# Patient Record
Sex: MALE | Race: BLACK OR AFRICAN AMERICAN | NOT HISPANIC OR LATINO | Employment: UNEMPLOYED | URBAN - METROPOLITAN AREA
[De-identification: names, ages, dates, MRNs, and addresses within clinical notes are randomized per-mention and may not be internally consistent; named-entity substitution may affect disease eponyms.]

---

## 2017-07-03 ENCOUNTER — HOSPITAL ENCOUNTER (EMERGENCY)
Facility: HOSPITAL | Age: 37
Discharge: HOME | End: 2017-07-03
Attending: EMERGENCY MEDICINE
Payer: MEDICAID

## 2017-07-03 VITALS
WEIGHT: 170 LBS | HEIGHT: 74 IN | DIASTOLIC BLOOD PRESSURE: 90 MMHG | HEART RATE: 81 BPM | TEMPERATURE: 98 F | SYSTOLIC BLOOD PRESSURE: 138 MMHG | BODY MASS INDEX: 21.82 KG/M2 | RESPIRATION RATE: 16 BRPM | OXYGEN SATURATION: 98 %

## 2017-07-03 DIAGNOSIS — N48.1 BALANITIS: Primary | ICD-10-CM

## 2017-07-03 LAB
BILIRUB UR QL STRIP: NEGATIVE
CLARITY UR: CLEAR
COLOR UR: YELLOW
GLUCOSE UR QL STRIP: NEGATIVE
HGB UR QL STRIP: NEGATIVE
KETONES UR QL STRIP: NEGATIVE
LEUKOCYTE ESTERASE UR QL STRIP: NEGATIVE
NITRITE UR QL STRIP: NEGATIVE
PH UR STRIP: 7 [PH] (ref 5–8)
PROT UR QL STRIP: NEGATIVE
SP GR UR STRIP: 1.02 (ref 1–1.03)
URN SPEC COLLECT METH UR: NORMAL
UROBILINOGEN UR STRIP-ACNC: 1 EU/DL

## 2017-07-03 PROCEDURE — 87591 N.GONORRHOEAE DNA AMP PROB: CPT

## 2017-07-03 PROCEDURE — 25000003 PHARM REV CODE 250: Performed by: EMERGENCY MEDICINE

## 2017-07-03 PROCEDURE — 99283 EMERGENCY DEPT VISIT LOW MDM: CPT | Mod: 25

## 2017-07-03 PROCEDURE — 81003 URINALYSIS AUTO W/O SCOPE: CPT

## 2017-07-03 PROCEDURE — 63600175 PHARM REV CODE 636 W HCPCS: Performed by: EMERGENCY MEDICINE

## 2017-07-03 PROCEDURE — 96372 THER/PROPH/DIAG INJ SC/IM: CPT

## 2017-07-03 RX ORDER — NYSTATIN 100000 U/G
OINTMENT TOPICAL 2 TIMES DAILY
Qty: 1 TUBE | Refills: 0 | Status: SHIPPED | OUTPATIENT
Start: 2017-07-03 | End: 2018-12-12

## 2017-07-03 RX ORDER — CEFTRIAXONE 250 MG/1
250 INJECTION, POWDER, FOR SOLUTION INTRAMUSCULAR; INTRAVENOUS
Status: COMPLETED | OUTPATIENT
Start: 2017-07-03 | End: 2017-07-03

## 2017-07-03 RX ORDER — METRONIDAZOLE 500 MG/1
2000 TABLET ORAL
Status: COMPLETED | OUTPATIENT
Start: 2017-07-03 | End: 2017-07-03

## 2017-07-03 RX ORDER — AZITHROMYCIN 250 MG/1
1000 TABLET, FILM COATED ORAL
Status: COMPLETED | OUTPATIENT
Start: 2017-07-03 | End: 2017-07-03

## 2017-07-03 RX ADMIN — AZITHROMYCIN 1000 MG: 250 TABLET, FILM COATED ORAL at 07:07

## 2017-07-03 RX ADMIN — CEFTRIAXONE SODIUM 250 MG: 250 INJECTION, POWDER, FOR SOLUTION INTRAMUSCULAR; INTRAVENOUS at 07:07

## 2017-07-03 RX ADMIN — METRONIDAZOLE 2000 MG: 500 TABLET ORAL at 07:07

## 2017-07-04 LAB
C TRACH DNA SPEC QL NAA+PROBE: NOT DETECTED
N GONORRHOEA DNA SPEC QL NAA+PROBE: NOT DETECTED

## 2017-07-04 NOTE — ED PROVIDER NOTES
"Encounter Date: 7/3/2017       History     Chief Complaint   Patient presents with    Male  Problem     states, "I have a funny feeling after i ejaculate". denies dysuria; states has been to health unit and does not have std;      Pt comes to the ED with complaints of redness and sensitivity to the head of his penis for >2 wks. He also complains of a strange feeling when he ejaculates. His GF was recently dx'd with an STD. Pt denies dysuria, discharge or frequency          Review of patient's allergies indicates:  No Known Allergies  History reviewed. No pertinent past medical history.  History reviewed. No pertinent surgical history.  History reviewed. No pertinent family history.  Social History   Substance Use Topics    Smoking status: Never Smoker    Smokeless tobacco: Never Used    Alcohol use No     Review of Systems   Constitutional: Negative for fatigue and fever.   HENT: Negative for sore throat.    Respiratory: Negative for shortness of breath.    Cardiovascular: Negative for chest pain.   Gastrointestinal: Negative for nausea.   Genitourinary: Negative for decreased urine volume, difficulty urinating, discharge, dysuria, penile pain, penile swelling, testicular pain and urgency.        See HPI   Musculoskeletal: Negative for back pain.   Skin: Negative for rash.   Neurological: Negative for weakness.   Hematological: Does not bruise/bleed easily.   All other systems reviewed and are negative.      Physical Exam     Initial Vitals [07/03/17 1842]   BP Pulse Resp Temp SpO2   (!) 134/95 80 16 98.4 °F (36.9 °C) 96 %      MAP       108         Physical Exam    Nursing note and vitals reviewed.  Constitutional: Vital signs are normal. He appears well-developed and well-nourished. No distress.   HENT:   Head: Normocephalic and atraumatic.   Eyes: EOM are normal. Pupils are equal, round, and reactive to light.   Neck: Normal range of motion. Neck supple.   Cardiovascular: Normal rate and regular rhythm. "   Pulmonary/Chest: Breath sounds normal. No respiratory distress. He has no wheezes. He has no rhonchi. He has no rales. He exhibits no tenderness.   Abdominal: Soft. He exhibits no distension. There is no tenderness. There is no rebound and no guarding.   Genitourinary: No discharge found.   Genitourinary Comments: Erythema to glans and distal penis. No discharge or lesions noted   Musculoskeletal: Normal range of motion. He exhibits no tenderness.   Neurological: He is alert and oriented to person, place, and time. No cranial nerve deficit.   Skin: Skin is warm and dry.   Psychiatric: He has a normal mood and affect.         ED Course   Procedures  Labs Reviewed   C. TRACHOMATIS/N. GONORRHOEAE BY AMP DNA   URINALYSIS                               ED Course     Clinical Impression:   The encounter diagnosis was Balanitis.    Disposition:   Disposition: Discharged  Condition: Stable                        Oniel Collier MD  07/03/17 9477

## 2017-07-04 NOTE — ED NOTES
Patient has verified the spelling of their name and  on armband  LOC: The patient is awake, alert, and aware of environment with an appropriate affect, the patient is oriented x 4 and speaking appropriately.   APPEARANCE: Patient resting comfortably and in no acute distress, patient is clean and well groomed, patient's clothing is properly fastened.   SKIN: The skin is warm and dry, color consistent with ethnicity, patient has normal skin turgor and moist mucus membranes, skin intact, no breakdown or bruising noted.   : denies any burning with urination, pt states he has funny feeling after he ejaculates  MUSCULOSKELETAL: Patient moving all extremities spontaneously, no obvious swelling or deformities noted.   RESPIRATORY: Airway is open and patent, respirations are spontaneous, patient has a normal effort and rate, no accessory muscle use noted, bilateral breath sounds clear, denies SOB   ABDOMEN: Soft and non tender to palpation, no distention noted, normoactive bowel sounds present in all four quadrants, denies any nausea vomiting.   CARDIAC: Normal rate and rhythm, no peripheral edema noted, less then 3 second capillary refill, denies chest pain

## 2018-12-12 ENCOUNTER — HOSPITAL ENCOUNTER (EMERGENCY)
Facility: HOSPITAL | Age: 38
Discharge: HOME | End: 2018-12-13
Attending: EMERGENCY MEDICINE
Payer: MEDICAID

## 2018-12-12 VITALS
BODY MASS INDEX: 21.82 KG/M2 | HEIGHT: 74 IN | DIASTOLIC BLOOD PRESSURE: 68 MMHG | WEIGHT: 170 LBS | HEART RATE: 75 BPM | OXYGEN SATURATION: 99 % | RESPIRATION RATE: 21 BRPM | TEMPERATURE: 101 F | SYSTOLIC BLOOD PRESSURE: 128 MMHG

## 2018-12-12 DIAGNOSIS — A41.9 SEPSIS: ICD-10-CM

## 2018-12-12 DIAGNOSIS — J02.0 STREP PHARYNGITIS: Primary | ICD-10-CM

## 2018-12-12 DIAGNOSIS — R17 SERUM TOTAL BILIRUBIN ELEVATED: ICD-10-CM

## 2018-12-12 LAB
ALBUMIN SERPL BCP-MCNC: 4.3 G/DL
ALP SERPL-CCNC: 66 U/L
ALT SERPL W/O P-5'-P-CCNC: 11 U/L
ANION GAP SERPL CALC-SCNC: 6 MMOL/L
ANION GAP SERPL CALC-SCNC: 8 MMOL/L
AST SERPL-CCNC: 18 U/L
BASOPHILS # BLD AUTO: 0 K/UL
BASOPHILS NFR BLD: 0 %
BILIRUB SERPL-MCNC: 3.4 MG/DL
BILIRUB UR QL STRIP: NEGATIVE
BUN SERPL-MCNC: 13 MG/DL
BUN SERPL-MCNC: 14 MG/DL
CALCIUM SERPL-MCNC: 8.3 MG/DL
CALCIUM SERPL-MCNC: 9.7 MG/DL
CHLORIDE SERPL-SCNC: 104 MMOL/L
CHLORIDE SERPL-SCNC: 96 MMOL/L
CLARITY UR: CLEAR
CO2 SERPL-SCNC: 25 MMOL/L
CO2 SERPL-SCNC: 31 MMOL/L
COLOR UR: YELLOW
CREAT SERPL-MCNC: 1.4 MG/DL
CREAT SERPL-MCNC: 1.7 MG/DL
DEPRECATED S PYO AG THROAT QL EIA: POSITIVE
DIFFERENTIAL METHOD: ABNORMAL
EOSINOPHIL # BLD AUTO: 0 K/UL
EOSINOPHIL NFR BLD: 0 %
ERYTHROCYTE [DISTWIDTH] IN BLOOD BY AUTOMATED COUNT: 12.7 %
EST. GFR  (AFRICAN AMERICAN): 59 ML/MIN/1.73 M^2
EST. GFR  (AFRICAN AMERICAN): >60 ML/MIN/1.73 M^2
EST. GFR  (NON AFRICAN AMERICAN): 51 ML/MIN/1.73 M^2
EST. GFR  (NON AFRICAN AMERICAN): >60 ML/MIN/1.73 M^2
FLUAV AG SPEC QL IA: NEGATIVE
FLUBV AG SPEC QL IA: NEGATIVE
GLUCOSE SERPL-MCNC: 117 MG/DL
GLUCOSE SERPL-MCNC: 121 MG/DL
GLUCOSE UR QL STRIP: NEGATIVE
HCT VFR BLD AUTO: 43.3 %
HGB BLD-MCNC: 15.1 G/DL
HGB UR QL STRIP: ABNORMAL
KETONES UR QL STRIP: NEGATIVE
LACTATE SERPL-SCNC: 1.4 MMOL/L
LEUKOCYTE ESTERASE UR QL STRIP: NEGATIVE
LYMPHOCYTES # BLD AUTO: 1.4 K/UL
LYMPHOCYTES NFR BLD: 11.3 %
MCH RBC QN AUTO: 28.9 PG
MCHC RBC AUTO-ENTMCNC: 34.9 G/DL
MCV RBC AUTO: 83 FL
MONOCYTES # BLD AUTO: 1.2 K/UL
MONOCYTES NFR BLD: 9.9 %
NEUTROPHILS # BLD AUTO: 9.6 K/UL
NEUTROPHILS NFR BLD: 78.6 %
NITRITE UR QL STRIP: NEGATIVE
PH UR STRIP: 7 [PH] (ref 5–8)
PLATELET # BLD AUTO: 140 K/UL
PMV BLD AUTO: 12.3 FL
POTASSIUM SERPL-SCNC: 3.2 MMOL/L
POTASSIUM SERPL-SCNC: 3.4 MMOL/L
PROT SERPL-MCNC: 8.6 G/DL
PROT UR QL STRIP: NEGATIVE
RBC # BLD AUTO: 5.22 M/UL
SODIUM SERPL-SCNC: 135 MMOL/L
SODIUM SERPL-SCNC: 135 MMOL/L
SP GR UR STRIP: 1.01 (ref 1–1.03)
SPECIMEN SOURCE: NORMAL
URN SPEC COLLECT METH UR: ABNORMAL
UROBILINOGEN UR STRIP-ACNC: NEGATIVE EU/DL
WBC # BLD AUTO: 12.15 K/UL

## 2018-12-12 PROCEDURE — 80048 BASIC METABOLIC PNL TOTAL CA: CPT

## 2018-12-12 PROCEDURE — 83605 ASSAY OF LACTIC ACID: CPT

## 2018-12-12 PROCEDURE — 81003 URINALYSIS AUTO W/O SCOPE: CPT

## 2018-12-12 PROCEDURE — 80053 COMPREHEN METABOLIC PANEL: CPT

## 2018-12-12 PROCEDURE — 93005 ELECTROCARDIOGRAM TRACING: CPT

## 2018-12-12 PROCEDURE — 93010 ELECTROCARDIOGRAM REPORT: CPT | Mod: ,,, | Performed by: INTERNAL MEDICINE

## 2018-12-12 PROCEDURE — 87400 INFLUENZA A/B EACH AG IA: CPT

## 2018-12-12 PROCEDURE — 87040 BLOOD CULTURE FOR BACTERIA: CPT

## 2018-12-12 PROCEDURE — 99285 EMERGENCY DEPT VISIT HI MDM: CPT | Mod: 25

## 2018-12-12 PROCEDURE — 96361 HYDRATE IV INFUSION ADD-ON: CPT

## 2018-12-12 PROCEDURE — 25000003 PHARM REV CODE 250: Performed by: NURSE PRACTITIONER

## 2018-12-12 PROCEDURE — 96360 HYDRATION IV INFUSION INIT: CPT

## 2018-12-12 PROCEDURE — 96372 THER/PROPH/DIAG INJ SC/IM: CPT | Mod: 59

## 2018-12-12 PROCEDURE — 87880 STREP A ASSAY W/OPTIC: CPT

## 2018-12-12 PROCEDURE — 63600175 PHARM REV CODE 636 W HCPCS: Performed by: EMERGENCY MEDICINE

## 2018-12-12 PROCEDURE — 85025 COMPLETE CBC W/AUTO DIFF WBC: CPT

## 2018-12-12 RX ORDER — DEXAMETHASONE SODIUM PHOSPHATE 4 MG/ML
8 INJECTION, SOLUTION INTRA-ARTICULAR; INTRALESIONAL; INTRAMUSCULAR; INTRAVENOUS; SOFT TISSUE
Status: COMPLETED | OUTPATIENT
Start: 2018-12-12 | End: 2018-12-12

## 2018-12-12 RX ORDER — ACETAMINOPHEN 500 MG
1000 TABLET ORAL
Status: COMPLETED | OUTPATIENT
Start: 2018-12-12 | End: 2018-12-12

## 2018-12-12 RX ADMIN — SODIUM CHLORIDE 2313 ML: 0.9 INJECTION, SOLUTION INTRAVENOUS at 07:12

## 2018-12-12 RX ADMIN — DEXAMETHASONE SODIUM PHOSPHATE 8 MG: 4 INJECTION, SOLUTION INTRAMUSCULAR; INTRAVENOUS at 11:12

## 2018-12-12 RX ADMIN — PENICILLIN G BENZATHINE 1.2 MILLION UNITS: 1200000 INJECTION, SUSPENSION INTRAMUSCULAR at 11:12

## 2018-12-12 RX ADMIN — ACETAMINOPHEN 1000 MG: 500 TABLET ORAL at 07:12

## 2018-12-13 NOTE — ED NOTES
Pt discharged to home with family; no rx; pt given home care and follow up care including temp measures; pt given a referral for follow up care

## 2018-12-13 NOTE — ED PROVIDER NOTES
Encounter Date: 12/12/2018    SCRIBE #1 NOTE: I, Triny Mix, am scribing for, and in the presence of,  Dr. Lopez. I have scribed the entire note.       History     Chief Complaint   Patient presents with    Fever     pt presents to ED today reports temp of 104.1 x 2 days took motrin at 0800      A 39 y/o male with no significant PMHx presents to the ED for reported fever x 2 days. He reports highest noted fever of 104.1F temporal at home. Associated symptoms include sore throat, chills, and  diarrhea. He denies N/V, abdominal pain, cough, nasal congestion, myalgias, or any sick contacts. Patient endorses taking Advil at home for symptoms, without relief. On arrival patient is febrile to 102.5F. He did not receive annual flu shot this year.       The history is provided by the patient.     Review of patient's allergies indicates:  No Known Allergies  No past medical history on file.  No past surgical history on file.  No family history on file.  Social History     Tobacco Use    Smoking status: Never Smoker    Smokeless tobacco: Never Used   Substance Use Topics    Alcohol use: No    Drug use: Not on file     Review of Systems   Constitutional: Positive for chills and fever.   HENT: Positive for sore throat. Negative for congestion, ear pain and rhinorrhea.    Eyes: Negative for pain and visual disturbance.   Respiratory: Negative for cough, shortness of breath and wheezing.    Cardiovascular: Negative for chest pain and palpitations.   Gastrointestinal: Positive for diarrhea. Negative for abdominal pain, nausea and vomiting.   Genitourinary: Negative for dysuria and hematuria.   Musculoskeletal: Negative for back pain, myalgias and neck pain.   Skin: Negative for rash.   Neurological: Negative for dizziness, weakness, light-headedness and headaches.   Psychiatric/Behavioral: Negative for confusion.       Physical Exam     Initial Vitals [12/12/18 1844]   BP Pulse Resp Temp SpO2   (!) 126/56 105 16 (!)  102.5 °F (39.2 °C) 96 %      MAP       --         Physical Exam    Nursing note and vitals reviewed.  Constitutional: He appears well-developed and well-nourished. He is not diaphoretic. No distress.   HENT:   Head: Normocephalic and atraumatic.   Right Ear: Tympanic membrane, external ear and ear canal normal.   Left Ear: Tympanic membrane, external ear and ear canal normal.   Nose: Nose normal.   Mild posterior oropharyngeal erythema, without swelling or exudate   Eyes: EOM are normal. Pupils are equal, round, and reactive to light.   Neck: Normal range of motion. Neck supple.   No meningismus   Cardiovascular: Normal rate, regular rhythm and normal heart sounds. Exam reveals no friction rub.    No murmur heard.  Pulmonary/Chest: Breath sounds normal. No respiratory distress. He has no wheezes. He has no rhonchi. He has no rales.   Abdominal: Soft. He exhibits no distension. There is no tenderness.   Neurological: He is alert and oriented to person, place, and time. He has normal strength. GCS score is 15. GCS eye subscore is 4. GCS verbal subscore is 5. GCS motor subscore is 6.   Skin: Skin is warm and dry. Capillary refill takes less than 2 seconds. No rash noted.         ED Course   Critical Care  Date/Time: 12/12/2018 11:09 PM  Performed by: Hortencia Lopez MD  Authorized by: Hortencia Lopez MD   Direct patient critical care time: 15 minutes  Additional history critical care time: 10 minutes  Ordering / reviewing critical care time: 5 minutes  Documentation critical care time: 5 minutes  Total critical care time (exclusive of procedural time) : 35 minutes  Critical care was necessary to treat or prevent imminent or life-threatening deterioration of the following conditions: sepsis.        Labs Reviewed   THROAT SCREEN, RAPID - Abnormal; Notable for the following components:       Result Value    Rapid Strep A Screen Positive (*)     All other components within normal limits   CBC W/ AUTO DIFFERENTIAL -  Abnormal; Notable for the following components:    Platelets 140 (*)     Gran # (ANC) 9.6 (*)     Mono # 1.2 (*)     Gran% 78.6 (*)     Lymph% 11.3 (*)     All other components within normal limits   COMPREHENSIVE METABOLIC PANEL - Abnormal; Notable for the following components:    Sodium 135 (*)     Potassium 3.2 (*)     CO2 31 (*)     Glucose 121 (*)     Creatinine 1.7 (*)     Total Protein 8.6 (*)     Total Bilirubin 3.4 (*)     eGFR if  59 (*)     eGFR if non  51 (*)     All other components within normal limits   URINALYSIS, REFLEX TO URINE CULTURE - Abnormal; Notable for the following components:    Occult Blood UA Trace (*)     All other components within normal limits    Narrative:     Preferred Collection Type->Urine, Clean Catch   BASIC METABOLIC PANEL - Abnormal; Notable for the following components:    Sodium 135 (*)     Potassium 3.4 (*)     Glucose 117 (*)     Calcium 8.3 (*)     Anion Gap 6 (*)     All other components within normal limits   CULTURE, BLOOD   CULTURE, BLOOD   LACTIC ACID, PLASMA   INFLUENZA A AND B ANTIGEN          X-Rays:   Independently Interpreted Readings:   Other Readings:  Reviewed by myself, read by radiology.      Imaging Results          US Abdomen Limited (Gallbladder) (Final result)  Result time 12/12/18 22:10:41    Final result by Tammie Garcia MD (12/12/18 22:10:41)                 Impression:      No significant sonographic abnormality.      Electronically signed by: Tammie Garcia MD  Date:    12/12/2018  Time:    22:10             Narrative:    EXAMINATION:  US ABDOMEN LIMITED    CLINICAL HISTORY:  Unspecified jaundice    TECHNIQUE:  Limited ultrasound of the right upper quadrant of the abdomen (including pancreas, liver, gallbladder, common bile duct, and right kidney) was performed.    COMPARISON:  None.    FINDINGS:  Pancreas: Unremarkable in its visualized extent.    IVC: Unremarkable in its visualized extent    Liver: Normal in size,  measuring 13.1 cm. Homogeneous echotexture. No focal hepatic lesions.    Gallbladder: No calculi, wall thickening, or pericholecystic fluid.  Sonographic Yanez sign is reported to be negative by the ultrasound technologist.    Biliary system: The common duct is not dilated, measuring 0.3 cm.  No intrahepatic ductal dilatation.    Right kidney: Normal in size with no hydronephrosis, measuring 10.3 cm.    Miscellaneous: No upper abdominal ascites.                               X-Ray Chest AP Portable (Final result)  Result time 12/12/18 19:14:08    Final result by Jamison Capone MD (12/12/18 19:14:08)                 Impression:      1. No acute cardiopulmonary process.      Electronically signed by: Jamison Capone MD  Date:    12/12/2018  Time:    19:14             Narrative:    EXAMINATION:  XR CHEST AP PORTABLE    CLINICAL HISTORY:  Sepsis;    TECHNIQUE:  Single frontal view of the chest was performed.    COMPARISON:  None    FINDINGS:  The cardiomediastinal silhouette is not enlarged..  There is no pleural effusion.  The trachea is midline.  The lungs are symmetrically expanded bilaterally without evidence of acute parenchymal process. No large focal consolidation seen.  There is no pneumothorax.  The osseous structures are unremarkable.                               Medical Decision Making:   Initial Assessment:   A 39 y/o male presents to the ED for reported fever x 2 days.   Differential Diagnosis:   Strep throat, pharyngitis, Otitis, URI, bronchitis, pneumonia, gastritis, gastroenteritis, influenza, UTI    Clinical Tests:   Lab Tests: Reviewed and Ordered  Radiological Study: Ordered and Reviewed  Medical Tests: Reviewed and Ordered  ED Management:  11:13 PM   Patient states that he Fever improving, 100.1F.     Upon re-evaluation, the patient's status has improved.  After complete ED evaluation, clinical impression is most consistent with strep pharyngitis.  PCP follow-up within 1 week was  recommended.    After taking into careful account the patient's history, physical exam findings, as well as empirical and objective data obtained throughout ED workup, I feel no emergent medical condition has been identified. No further evaluation or admission was felt to be required, and the patient is stable for discharge from the ED. The patient and any additional family present were updated with test results, overall clinical impression, and recommended further plan of care, including discharge instructions as provided and outpatient follow-up for continued evaluation and management as needed. All questions were answered. The patient expressed understanding and agreed with current plan for discharge and follow-up plan of care. Strict ED return precautions were provided, including return/worsening of current symptoms, new symptoms, or any other concerns.                     ED Course as of Dec 12 2331   Wed Dec 12, 2018   1848 This is a SORT/MSE of a 38 y.o. male presenting to the ED with c/o fever x 2 days. Fever as high as 104.1 at home. Last took Motrin around 8:00 am.  Pertinent exam findings tachycardia and febrile illness.  Care will be transferred to an alternate provider when patient is roomed for a full evaluation and final disposition. BACILIO Rivers 6:48 PM   [CH]      ED Course User Index  [CH] Damir Mcginnis NP     Clinical Impression:     1. Strep pharyngitis  Disposition:   Disposition: Discharged  Condition: Stable       I, Hortencia Lopez,  personally performed the services described in this documentation. All medical record entries made by the scribe were at my direction and in my presence.  I have reviewed the chart and agree that the record reflects my personal performance and is accurate and complete. Hortencia Lopez M.D. 11:31 PM12/12/2018                   Hortencia Lopez MD  12/12/18 9720

## 2018-12-18 LAB
BACTERIA BLD CULT: NORMAL
BACTERIA BLD CULT: NORMAL

## 2019-08-06 ENCOUNTER — HOSPITAL ENCOUNTER (EMERGENCY)
Facility: HOSPITAL | Age: 39
Discharge: HOME | End: 2019-08-06
Attending: EMERGENCY MEDICINE
Payer: MEDICAID

## 2019-08-06 VITALS
SYSTOLIC BLOOD PRESSURE: 114 MMHG | HEART RATE: 70 BPM | RESPIRATION RATE: 20 BRPM | WEIGHT: 175 LBS | DIASTOLIC BLOOD PRESSURE: 70 MMHG | TEMPERATURE: 99 F | BODY MASS INDEX: 22.47 KG/M2 | OXYGEN SATURATION: 100 %

## 2019-08-06 DIAGNOSIS — J02.9 PHARYNGITIS, UNSPECIFIED ETIOLOGY: Primary | ICD-10-CM

## 2019-08-06 LAB — DEPRECATED S PYO AG THROAT QL EIA: NEGATIVE

## 2019-08-06 PROCEDURE — 99284 EMERGENCY DEPT VISIT MOD MDM: CPT

## 2019-08-06 PROCEDURE — 87081 CULTURE SCREEN ONLY: CPT

## 2019-08-06 PROCEDURE — 87880 STREP A ASSAY W/OPTIC: CPT

## 2019-08-06 PROCEDURE — 25000003 PHARM REV CODE 250: Performed by: EMERGENCY MEDICINE

## 2019-08-06 RX ORDER — CETIRIZINE HYDROCHLORIDE 10 MG/1
10 TABLET ORAL DAILY
Qty: 30 TABLET | Refills: 0 | Status: SHIPPED | OUTPATIENT
Start: 2019-08-06 | End: 2019-09-24 | Stop reason: ALTCHOICE

## 2019-08-06 RX ORDER — FLUTICASONE PROPIONATE 50 MCG
1 SPRAY, SUSPENSION (ML) NASAL 2 TIMES DAILY PRN
Qty: 15 G | Refills: 0 | Status: SHIPPED | OUTPATIENT
Start: 2019-08-06 | End: 2019-09-24 | Stop reason: ALTCHOICE

## 2019-08-06 RX ORDER — ACETAMINOPHEN 500 MG
1000 TABLET ORAL
Status: COMPLETED | OUTPATIENT
Start: 2019-08-06 | End: 2019-08-06

## 2019-08-06 RX ADMIN — ACETAMINOPHEN 1000 MG: 500 TABLET ORAL at 03:08

## 2019-08-06 NOTE — ED PROVIDER NOTES
Encounter Date: 8/6/2019    SCRIBE #1 NOTE: I, Kristin Drake, am scribing for, and in the presence of,  NOAM Schaeffer. I have scribed the entire note.       History     Chief Complaint   Patient presents with    Sore Throat     reports sore throat and coughx2 days. reports fever today of 100. denies taking medication for fever.      Time seen by provider: 4:04 PM    This is a 39 y.o. male with no significant PHMx who presents with complaint of sore throat x 2 days. Associated symptoms include bilateral ear pain, itchy throat, fever, congestion and dry cough. Yesterday eyes were draining clear drainage more than usual. The patient denies visual disturbance, confusion, chills, SOB, CP, abdominal pain, nausea, vomiting, dysuria, hematuria, back pain, neck pain, neck stiffness, HA, rash, weakness, history of asthma or pneumonia.  No home therapy was attempted; Tylenol was given at triage. He does not smoke.  The patient has no other medical complaints or concerns at this time.  He is tolerating oral fluids without difficulty.          The history is provided by the patient.     Review of patient's allergies indicates:  No Known Allergies  History reviewed. No pertinent past medical history.  History reviewed. No pertinent surgical history.  History reviewed. No pertinent family history.  Social History     Tobacco Use    Smoking status: Never Smoker    Smokeless tobacco: Never Used   Substance Use Topics    Alcohol use: No    Drug use: Not on file     Review of Systems   Constitutional: Positive for fever. Negative for chills.   HENT: Positive for congestion, postnasal drip and sore throat. Negative for dental problem, drooling, ear discharge, ear pain, rhinorrhea, sinus pain, trouble swallowing and voice change.         Bilateral ear itching     Eyes: Negative for visual disturbance.   Respiratory: Positive for cough. Negative for shortness of breath.    Cardiovascular: Negative for chest pain.    Gastrointestinal: Negative for abdominal pain, nausea and vomiting.   Genitourinary: Negative for dysuria and hematuria.   Musculoskeletal: Negative for back pain, neck pain and neck stiffness.   Skin: Negative for rash and wound.   Allergic/Immunologic: Negative for immunocompromised state.   Neurological: Negative for syncope, weakness and headaches.   Psychiatric/Behavioral: Negative for confusion.       Physical Exam     Initial Vitals [08/06/19 1510]   BP Pulse Resp Temp SpO2   122/82 92 20 (!) 101.6 °F (38.7 °C) 97 %      MAP       --         Physical Exam    Nursing note and vitals reviewed.  Constitutional: Vital signs are normal. He appears well-developed and well-nourished. He is not diaphoretic. He is active and cooperative. He is easily aroused.  Non-toxic appearance. He does not have a sickly appearance. He does not appear ill. No distress.   HENT:   Head: Normocephalic and atraumatic.   Right Ear: External ear normal.   Left Ear: External ear normal.   Nose: Nose normal.   Mouth/Throat: Uvula is midline and mucous membranes are normal. No oral lesions. No trismus in the jaw. No dental abscesses or uvula swelling. Posterior oropharyngeal erythema present. No oropharyngeal exudate, posterior oropharyngeal edema or tonsillar abscesses.   Normal, unaffected voice.  Managing secretions without difficulty.     Eyes: Conjunctivae and EOM are normal.   Neck: Normal range of motion, full passive range of motion without pain and phonation normal. Neck supple. No spinous process tenderness present. Normal range of motion present.   Cardiovascular: Normal rate, regular rhythm and normal heart sounds. Exam reveals no gallop and no friction rub.    No murmur heard.  Pulmonary/Chest: Effort normal and breath sounds normal. He has no wheezes. He has no rhonchi. He has no rales.   Abdominal: Soft. Normal appearance and bowel sounds are normal. There is no tenderness. There is no rebound and no guarding.    Musculoskeletal: Normal range of motion. He exhibits no edema or tenderness.   Lymphadenopathy:     He has no cervical adenopathy.   Neurological: He is alert, oriented to person, place, and time and easily aroused. He has normal strength. GCS eye subscore is 4. GCS verbal subscore is 5. GCS motor subscore is 6.   Skin: Skin is warm, dry and intact. Capillary refill takes less than 2 seconds. No bruising and no rash noted. No erythema.   Psychiatric: He has a normal mood and affect. His speech is normal and behavior is normal. Judgment and thought content normal. Cognition and memory are normal.         ED Course   Procedures  Labs Reviewed   THROAT SCREEN, RAPID   CULTURE, STREP A,  THROAT               Medical Decision Making:   Initial Assessment:   This is a 39 y.o. male with no significant PHMx who presents with complaint of sore throat x 2 days.   Differential Diagnosis:   Strep throat, pharyngitis, URI, allergies, irritants, viral syndrome  Clinical Tests:   Lab Tests: Ordered and Reviewed  ED Management:  Tylenol, rapid strep screen  Patient presents to ED for evaluation of sore throat.  Negative strep.  After complete evaluation, including thorough history and physical exam, the patient's symptoms are most likely due to viral upper respiratory infection. There are no concerning features on physical exam to suggest bacterial otitis media/externa, sinusitis, pharyngitis, or peritonsillar abscess. Vital signs do not suggest sepsis. Lung sounds are clear and not consistent with pneumonia. There is no neck pain or limited ROM to suggest retropharyngeal abscess or meningitis. The patient will be treated with supportive care. Encouraged follow-up with PCP for further evaluation.                             Clinical Impression:       ICD-10-CM ICD-9-CM   1. Pharyngitis, unspecified etiology J02.9 462                 Maggie CASAS ESTUARDO, personally performed the services described in this documentation.  All medical record entries made by the scribe were at my direction and in my presence.  I have reviewed the chart and agree that the record reflects my personal performance and is accurate and complete.     BACILIO Schaeffer  4:36 PM 08/06/2019                     NOAM Marcum  08/06/19 2575

## 2019-08-06 NOTE — DISCHARGE INSTRUCTIONS
Increase your water intake. Use tylenol and motrin as directed on the labeling for pain.  Your throat culture results will be available in three days.  If you need antibiotics based upon your culture, we will call you and then call antibiotics in to your pharmacy.  Return to the ED if your condition changes, progresses, or if you have any concerns.

## 2019-08-06 NOTE — ED TRIAGE NOTES
Pt presents to ED and reports sore throat with onset x3 days ago and fever of 100 x2 days. Pt states pain is now 3/10 at this time. Pt tolerates fluids well. No n/v.

## 2019-08-08 LAB — BACTERIA THROAT CULT: NORMAL

## 2019-09-24 ENCOUNTER — HOSPITAL ENCOUNTER (EMERGENCY)
Facility: HOSPITAL | Age: 39
Discharge: HOME | End: 2019-09-24
Attending: EMERGENCY MEDICINE
Payer: MEDICAID

## 2019-09-24 VITALS
RESPIRATION RATE: 16 BRPM | TEMPERATURE: 99 F | SYSTOLIC BLOOD PRESSURE: 131 MMHG | WEIGHT: 180 LBS | OXYGEN SATURATION: 97 % | HEART RATE: 67 BPM | HEIGHT: 74 IN | BODY MASS INDEX: 23.1 KG/M2 | DIASTOLIC BLOOD PRESSURE: 86 MMHG

## 2019-09-24 DIAGNOSIS — Z20.2 POSSIBLE EXPOSURE TO STD: Primary | ICD-10-CM

## 2019-09-24 LAB
BILIRUB UR QL STRIP: NEGATIVE
CLARITY UR: CLEAR
COLOR UR: YELLOW
GLUCOSE UR QL STRIP: NEGATIVE
HGB UR QL STRIP: NEGATIVE
KETONES UR QL STRIP: ABNORMAL
LEUKOCYTE ESTERASE UR QL STRIP: NEGATIVE
NITRITE UR QL STRIP: NEGATIVE
PH UR STRIP: 7 [PH] (ref 5–8)
PROT UR QL STRIP: NEGATIVE
SP GR UR STRIP: 1.02 (ref 1–1.03)
URN SPEC COLLECT METH UR: ABNORMAL
UROBILINOGEN UR STRIP-ACNC: 1 EU/DL

## 2019-09-24 PROCEDURE — 87491 CHLMYD TRACH DNA AMP PROBE: CPT

## 2019-09-24 PROCEDURE — 99283 EMERGENCY DEPT VISIT LOW MDM: CPT

## 2019-09-24 PROCEDURE — 81003 URINALYSIS AUTO W/O SCOPE: CPT

## 2019-09-25 LAB
C TRACH DNA SPEC QL NAA+PROBE: NOT DETECTED
N GONORRHOEA DNA SPEC QL NAA+PROBE: NOT DETECTED

## 2019-09-25 NOTE — PROVIDER PROGRESS NOTES - EMERGENCY DEPT.
Encounter Date: 9/24/2019    ED Physician Progress Notes         I have evaluated and performed a medical screening assessment on this patient while awaiting a thorough evaluation and treatment. All of the emergency department beds are occupied at this time. When appropriate, laboratory studies will be ordered from triage. The patient has been advised of this process and care plan. Patient reports sexual partner told him to get tested for trich. He denies symptoms.     Orders pending:  Disposition:

## 2019-09-25 NOTE — ED PROVIDER NOTES
Encounter Date: 9/24/2019    SCRIBE #1 NOTE: IXochilt and am scribing for, and in the presence of, CRISTOBAL Calle.       History     Chief Complaint   Patient presents with    Exposure to STD     trich     Time seen by provider: 7:25 PM on 09/24/2019    Joshua Posada is a 39 y.o. male who presents to the ED for possible exposure to an STD. The patient reports that his ex-girlfriend from 2 months ago recently told him that she tested positive for trichomonas. The patient denies penile discharge, increased urinary frequency, difficulty urinating, painful urination, blood in his urine, or any other symptoms at this time. No PMHx. No genitourinary PSHx. No known drug allergies noted.    The history is provided by the patient.     Review of patient's allergies indicates:  No Known Allergies  History reviewed. No pertinent past medical history.  History reviewed. No pertinent surgical history.  History reviewed. No pertinent family history.  Social History     Tobacco Use    Smoking status: Never Smoker    Smokeless tobacco: Never Used   Substance Use Topics    Alcohol use: No    Drug use: Not on file     Review of Systems   Constitutional: Negative for activity change, appetite change, chills and fever.   HENT: Negative for congestion, ear pain, rhinorrhea, sinus pressure, sinus pain, sneezing, sore throat and voice change.    Eyes: Negative for pain, discharge and redness.   Respiratory: Negative for cough, shortness of breath, wheezing and stridor.    Cardiovascular: Negative for chest pain, palpitations and leg swelling.   Gastrointestinal: Negative for abdominal distention, abdominal pain, blood in stool, constipation, diarrhea, nausea and vomiting.   Genitourinary: Negative for difficulty urinating, discharge, dysuria, flank pain, frequency, hematuria and urgency.   Musculoskeletal: Negative for arthralgias, gait problem, joint swelling and myalgias.   Skin: Negative for rash and wound.    Neurological: Negative for dizziness, syncope, facial asymmetry, speech difficulty, weakness, light-headedness, numbness and headaches.   Hematological: Negative for adenopathy.   Psychiatric/Behavioral: Negative.        Physical Exam     Initial Vitals [09/24/19 1903]   BP Pulse Resp Temp SpO2   131/86 67 16 98.8 °F (37.1 °C) 97 %      MAP       --         Physical Exam    Nursing note and vitals reviewed.  Constitutional: He appears well-developed and well-nourished. He is active.   HENT:   Head: Normocephalic and atraumatic.   Right Ear: External ear normal.   Left Ear: External ear normal.   Nose: Nose normal.   Mouth/Throat: Oropharynx is clear and moist. No oropharyngeal exudate.   Eyes: Conjunctivae, EOM and lids are normal. Pupils are equal, round, and reactive to light. Right eye exhibits no chemosis and no discharge. Left eye exhibits no chemosis and no discharge. Right conjunctiva is not injected. Left conjunctiva is not injected.   Neck: Trachea normal and normal range of motion. Neck supple. No stridor present. No tracheal deviation present. No neck rigidity.   Cardiovascular: Normal rate, regular rhythm, normal heart sounds and normal pulses. Exam reveals no distant heart sounds and no friction rub.    No murmur heard.  Pulmonary/Chest: Breath sounds normal. No stridor. He has no wheezes. He has no rhonchi. He has no rales.   Genitourinary: No discharge found.   Musculoskeletal: Normal range of motion.   Neurological: He is alert and oriented to person, place, and time. He has normal strength. GCS eye subscore is 4. GCS verbal subscore is 5. GCS motor subscore is 6.   Skin: Skin is warm, dry and intact. Capillary refill takes less than 2 seconds. No rash noted.   Psychiatric: He has a normal mood and affect. His speech is normal and behavior is normal. Thought content normal.         ED Course   Procedures  Labs Reviewed   URINALYSIS, REFLEX TO URINE CULTURE - Abnormal; Notable for the following  components:       Result Value    Ketones, UA Trace (*)     All other components within normal limits    Narrative:     Preferred Collection Type->Urine, Clean Catch   C. TRACHOMATIS/N. GONORRHOEAE BY AMP DNA          Imaging Results    None          Medical Decision Making:   History:   Old Medical Records: I decided to obtain old medical records.  Differential Diagnosis:   Uti  Trich  Gc/chlamydia   Clinical Tests:   Lab Tests: Ordered and Reviewed       APC / Resident Notes:   39 year old male presents for testing for trich after being told by his ex girlfriend that she was positive. Pt denies sexual contact with her for past two months. He denies urinary complaints, penile discharge or other associated symptoms. U/A negative for trich and shows no signs of uti. Gc/chlamydia culture sent. Will treat pending results since pt is not having symptoms. He is instructed to refrain from sexual contact until results obtained. He is instructed to f/u with pcp for further testing. I have discussed pt with Dr Pleitez who agrees with poc. Pt voices understanding and is agreeable to the plan.  He is given specific return precautions.          Scribe Attestation:   Scribe #1: I performed the above scribed service and the documentation accurately describes the services I performed. I attest to the accuracy of the note.    I, CRISTOBAL Salvador, personally performed the services described in this documentation. All medical record entries made by the scribe were at my direction and in my presence.  I have reviewed the chart and agree that the record reflects my personal performance and is accurate and complete. CRISTOBAL Salvador.  12:27 AM 09/25/2019             Clinical Impression:       ICD-10-CM ICD-9-CM   1. Possible exposure to STD Z20.2 V01.6         Disposition:   Disposition: Discharged  Condition: Stable                        Tiny Choudhury NP  09/25/19 0027

## 2019-09-25 NOTE — ED NOTES
Per the lab they need more urine for the patients testing. KERVINN Ghislaine aware. Pt in waiting room still waiting for room.

## 2019-09-25 NOTE — DISCHARGE INSTRUCTIONS
Follow up with primary care doctor for further testing. Always use condoms. Refrain from sex until Gc/chlamydia cultures. Return to ED for new or worsening symptoms.

## 2019-10-25 ENCOUNTER — HOSPITAL ENCOUNTER (EMERGENCY)
Facility: HOSPITAL | Age: 39
Discharge: HOME | End: 2019-10-25
Attending: EMERGENCY MEDICINE
Payer: MEDICAID

## 2019-10-25 VITALS
HEART RATE: 65 BPM | BODY MASS INDEX: 23.1 KG/M2 | OXYGEN SATURATION: 99 % | SYSTOLIC BLOOD PRESSURE: 127 MMHG | DIASTOLIC BLOOD PRESSURE: 68 MMHG | TEMPERATURE: 98 F | RESPIRATION RATE: 18 BRPM | HEIGHT: 74 IN | WEIGHT: 180 LBS

## 2019-10-25 DIAGNOSIS — Z20.2 STD EXPOSURE: Primary | ICD-10-CM

## 2019-10-25 PROCEDURE — 99283 EMERGENCY DEPT VISIT LOW MDM: CPT

## 2019-10-25 PROCEDURE — 25000003 PHARM REV CODE 250: Performed by: EMERGENCY MEDICINE

## 2019-10-25 RX ORDER — METRONIDAZOLE 500 MG/1
2000 TABLET ORAL
Status: COMPLETED | OUTPATIENT
Start: 2019-10-25 | End: 2019-10-25

## 2019-10-25 RX ADMIN — METRONIDAZOLE 2000 MG: 500 TABLET ORAL at 10:10

## 2019-10-25 NOTE — ED PROVIDER NOTES
Encounter Date: 10/25/2019    SCRIBE #1 NOTE: I, Michelle Ahumada, am scribing for, and in the presence of,  Dr. Myers. I have scribed the entire note.       History     Chief Complaint   Patient presents with    Exposure to STD     pt had trichmonosis in past feels like has again, feeling of urgercy. tested  3 weeks ago in [place] and told neg.     Time seen by provider: 10:48 AM    This is a 39 y.o. male with a history of trichomoniasis who presents to the ED due to an exposure to an STD. Patient reports he was tested for STD's in [place] and tested negative for gonorrhea and chlamydia. He says he has a tingling feeling when he urinates, which is the similar symptom he had when diagnosed with trichomoniasis. He denies fever, abdominal pain, nausea, vomiting, penile discharge, or any other complaints at this time.    The history is provided by the patient.     Review of patient's allergies indicates:  No Known Allergies  History reviewed. No pertinent past medical history.  History reviewed. No pertinent surgical history.  History reviewed. No pertinent family history.  Social History     Tobacco Use    Smoking status: Never Smoker    Smokeless tobacco: Never Used   Substance Use Topics    Alcohol use: No    Drug use: Never     Review of Systems   Genitourinary: Positive for dysuria.   All other systems reviewed and are negative.      Physical Exam     Initial Vitals [10/25/19 0956]   BP Pulse Resp Temp SpO2   127/68 65 18 98 °F (36.7 °C) 99 %      MAP       --         Physical Exam    Nursing note and vitals reviewed.  Constitutional: He appears well-developed and well-nourished. No distress.   HENT:   Head: Normocephalic and atraumatic.   Mouth/Throat: Oropharynx is clear and moist.   Eyes: Conjunctivae and EOM are normal. Pupils are equal, round, and reactive to light.   Neck: Normal range of motion. Neck supple. No stridor present. No tracheal deviation present.   Cardiovascular: Normal rate, regular  rhythm and normal heart sounds.   No murmur heard.  Pulmonary/Chest: Breath sounds normal. No respiratory distress.   Abdominal: Soft. Bowel sounds are normal. There is no tenderness. There is no rebound and no guarding.   Musculoskeletal: Normal range of motion. He exhibits no edema or tenderness.   Neurological: He is alert and oriented to person, place, and time. No sensory deficit.   Skin: Skin is warm and dry. Capillary refill takes less than 2 seconds.   Psychiatric: He has a normal mood and affect. His behavior is normal.         ED Course   Procedures  Labs Reviewed - No data to display          Medical Decision Making:   ED Management:  39-year-old male who was exposed to Trichomonas.  I will treat him with a 2 g dose of Flagyl here in the ED.  I have stressed precautions so he does not get reinfected.  Patient will follow up as needed.                      Clinical Impression:     1. STD exposure        Disposition:   Disposition: Discharged  Condition: Stable         I, Dr. Alexis Myers, personally performed the services described in this documentation. All medical record entries made by the scribe were at my direction and in my presence. I have reviewed the chart and agree that the record reflects my personal performance and is accurate and complete. Alexis Myers MD.  12:36 PM 10/25/2019             Alexis Myers MD  10/25/19 5679

## 2019-10-25 NOTE — ED NOTES
39 year old male presents to ed cc of exposure to std patient states urinary urgency with slight dysuria. Patient denies fever chills n/v/d

## 2021-12-12 ENCOUNTER — OFFICE VISIT (OUTPATIENT)
Dept: URGENT CARE | Facility: CLINIC | Age: 41
End: 2021-12-12
Payer: MEDICAID

## 2021-12-12 VITALS
BODY MASS INDEX: 23.36 KG/M2 | RESPIRATION RATE: 12 BRPM | WEIGHT: 182 LBS | OXYGEN SATURATION: 98 % | HEIGHT: 74 IN | TEMPERATURE: 98 F | DIASTOLIC BLOOD PRESSURE: 76 MMHG | SYSTOLIC BLOOD PRESSURE: 118 MMHG | HEART RATE: 69 BPM

## 2021-12-12 DIAGNOSIS — H92.01 RIGHT EAR PAIN: ICD-10-CM

## 2021-12-12 DIAGNOSIS — H60.91 OTITIS EXTERNA OF RIGHT EAR, UNSPECIFIED CHRONICITY, UNSPECIFIED TYPE: ICD-10-CM

## 2021-12-12 DIAGNOSIS — R22.31 FINGER MASS, RIGHT: Primary | ICD-10-CM

## 2021-12-12 PROCEDURE — 73140 XR FINGER 2 OR MORE VIEWS RIGHT: ICD-10-PCS | Mod: RT,S$GLB,, | Performed by: RADIOLOGY

## 2021-12-12 PROCEDURE — 99204 OFFICE O/P NEW MOD 45 MIN: CPT | Mod: S$GLB,,, | Performed by: NURSE PRACTITIONER

## 2021-12-12 PROCEDURE — 99204 PR OFFICE/OUTPT VISIT, NEW, LEVL IV, 45-59 MIN: ICD-10-PCS | Mod: S$GLB,,, | Performed by: NURSE PRACTITIONER

## 2021-12-12 PROCEDURE — 73140 X-RAY EXAM OF FINGER(S): CPT | Mod: RT,S$GLB,, | Performed by: RADIOLOGY

## 2021-12-12 RX ORDER — OFLOXACIN 3 MG/ML
10 SOLUTION AURICULAR (OTIC) DAILY
Qty: 5 ML | Refills: 0 | Status: SHIPPED | OUTPATIENT
Start: 2021-12-12 | End: 2021-12-19

## 2022-01-03 ENCOUNTER — HOSPITAL ENCOUNTER (EMERGENCY)
Facility: HOSPITAL | Age: 42
Discharge: HOME | End: 2022-01-03
Attending: EMERGENCY MEDICINE
Payer: MEDICAID

## 2022-01-03 VITALS
TEMPERATURE: 100 F | DIASTOLIC BLOOD PRESSURE: 74 MMHG | BODY MASS INDEX: 23.31 KG/M2 | HEART RATE: 96 BPM | SYSTOLIC BLOOD PRESSURE: 111 MMHG | WEIGHT: 181.56 LBS | RESPIRATION RATE: 16 BRPM | OXYGEN SATURATION: 97 %

## 2022-01-03 DIAGNOSIS — B34.9 VIRAL SYNDROME: Primary | ICD-10-CM

## 2022-01-03 PROCEDURE — U0003 INFECTIOUS AGENT DETECTION BY NUCLEIC ACID (DNA OR RNA); SEVERE ACUTE RESPIRATORY SYNDROME CORONAVIRUS 2 (SARS-COV-2) (CORONAVIRUS DISEASE [COVID-19]), AMPLIFIED PROBE TECHNIQUE, MAKING USE OF HIGH THROUGHPUT TECHNOLOGIES AS DESCRIBED BY CMS-2020-01-R: HCPCS | Performed by: EMERGENCY MEDICINE

## 2022-01-03 PROCEDURE — 99283 EMERGENCY DEPT VISIT LOW MDM: CPT

## 2022-01-03 PROCEDURE — 25000003 PHARM REV CODE 250: Performed by: EMERGENCY MEDICINE

## 2022-01-03 PROCEDURE — U0005 INFEC AGEN DETEC AMPLI PROBE: HCPCS | Performed by: EMERGENCY MEDICINE

## 2022-01-03 RX ORDER — BENZONATATE 100 MG/1
200 CAPSULE ORAL 3 TIMES DAILY PRN
Qty: 15 CAPSULE | Refills: 0 | Status: SHIPPED | OUTPATIENT
Start: 2022-01-03 | End: 2023-08-01

## 2022-01-03 RX ORDER — BENZONATATE 100 MG/1
200 CAPSULE ORAL
Status: COMPLETED | OUTPATIENT
Start: 2022-01-03 | End: 2022-01-03

## 2022-01-03 RX ADMIN — BENZONATATE 200 MG: 100 CAPSULE ORAL at 06:01

## 2022-01-03 NOTE — ED PROVIDER NOTES
Encounter Date: 1/3/2022       History     Chief Complaint   Patient presents with    Fever     For 2 days now and highest was 102. Cough, sore throat, earache.      Chief complaint:  Cough and sore throat    HPI:  41-year-old male presents with a 2 day history of cough, congestion, sore throat, malaise and myalgias.  He has no significant past medical history.  He also expresses concerns about a nodule of the left middle finger.  He denies any injury.  He request workup for bubbles in his urine and expresses concerns about proteinuria.  He has no abdominal pain, jaundice or history of renal disease.        Review of patient's allergies indicates:  No Known Allergies  No past medical history on file.  No past surgical history on file.  No family history on file.  Social History     Tobacco Use    Smoking status: Never Smoker    Smokeless tobacco: Never Used   Substance Use Topics    Alcohol use: No    Drug use: Never     Review of Systems   Constitutional: Positive for activity change, appetite change, chills, fatigue and fever.   HENT: Positive for congestion and sore throat.    Eyes: Negative for visual disturbance.   Respiratory: Positive for cough. Negative for apnea and shortness of breath.    Cardiovascular: Negative for chest pain and palpitations.   Gastrointestinal: Negative for abdominal distention and abdominal pain.   Genitourinary: Negative for difficulty urinating.   Musculoskeletal: Negative for neck pain.   Skin: Negative for pallor and rash.   Neurological: Negative for headaches.   Hematological: Does not bruise/bleed easily.   Psychiatric/Behavioral: Negative for agitation.       Physical Exam     Initial Vitals [01/03/22 0628]   BP Pulse Resp Temp SpO2   111/74 96 16 99.7 °F (37.6 °C) 97 %      MAP       --         Physical Exam    Nursing note and vitals reviewed.  Constitutional: He appears well-developed and well-nourished.   HENT:   Head: Normocephalic and atraumatic.   Mouth/Throat:  Oropharynx is clear and moist.   Posterior oropharynx without erythema, swelling or exudate   Eyes: Conjunctivae are normal.   Neck: Neck supple.   Normal range of motion.  Cardiovascular: Normal rate, regular rhythm and normal heart sounds. Exam reveals no gallop and no friction rub.    No murmur heard.  Pulmonary/Chest: Breath sounds normal. No respiratory distress. He has no wheezes. He has no rhonchi. He has no rales.   Abdominal: Abdomen is soft. He exhibits no distension. There is no abdominal tenderness.   Musculoskeletal:         General: Normal range of motion.      Cervical back: Normal range of motion and neck supple.      Comments: 5 mm nodule of the palmar surface of the left 3rd finger     Neurological: He is alert and oriented to person, place, and time.   Skin: Skin is warm and dry.   Psychiatric: He has a normal mood and affect.         ED Course   Procedures  Labs Reviewed   SARS-COV-2 (COVID-19) QUALITATIVE PCR          Imaging Results    None          Medications   benzonatate capsule 200 mg (has no administration in time range)     Medical Decision Making:   ED Management:  41-year-old male presents with congestion cough and earache.  The symptoms are strongly suggestive of a viral syndrome.  Pharyngeal exam is unremarkable with strep pharyngitis unlikely.  COVID is submitted.  He is encouraged to follow up with primary care for his multiple other unrelated concerns.                      Clinical Impression:   Final diagnoses:  [B34.9] Viral syndrome (Primary)          ED Disposition Condition    Discharge Stable        ED Prescriptions     Medication Sig Dispense Start Date End Date Auth. Provider    benzonatate (TESSALON) 100 MG capsule Take 2 capsules (200 mg total) by mouth 3 (three) times daily as needed for Cough. 15 capsule 1/3/2022  Agustin Ventura III, MD        Follow-up Information     Follow up With Specialties Details Why Contact Info    Nikhil Rust MD Family Medicine In 3 days   [address]  [address]  [facility]  [place] [address]  [phone]      Reymundo Lisa MD Orthopedic Surgery In 1 week  [address]  [address]  [facility]  [place] [address]  [phone]             Agustin Ventura III, MD  01/03/22 0655

## 2022-01-03 NOTE — ED NOTES
Pt identifiers checked and accurate with Joshua Posada     Pt presents to ED with complaints of fever, cough and sore throat x 2 days. Pt denies all other symptoms at this time.

## 2022-01-05 LAB
SARS-COV-2 RNA RESP QL NAA+PROBE: DETECTED
SARS-COV-2- CYCLE NUMBER: 16

## 2023-04-01 ENCOUNTER — OFFICE VISIT (OUTPATIENT)
Dept: URGENT CARE | Facility: CLINIC | Age: 43
End: 2023-04-01
Payer: MEDICAID

## 2023-04-01 VITALS
BODY MASS INDEX: 22.59 KG/M2 | WEIGHT: 176 LBS | OXYGEN SATURATION: 98 % | TEMPERATURE: 97 F | HEIGHT: 74 IN | DIASTOLIC BLOOD PRESSURE: 79 MMHG | RESPIRATION RATE: 18 BRPM | SYSTOLIC BLOOD PRESSURE: 114 MMHG | HEART RATE: 53 BPM

## 2023-04-01 DIAGNOSIS — T14.8XXA PUNCTURE WOUND: Primary | ICD-10-CM

## 2023-04-01 PROCEDURE — 90714 TD VACCINE GREATER THAN OR EQUAL TO 7YO WITH PRESERVATIVE IM: ICD-10-PCS | Mod: S$GLB,,, | Performed by: NURSE PRACTITIONER

## 2023-04-01 PROCEDURE — 90471 IMMUNIZATION ADMIN: CPT | Mod: S$GLB,,, | Performed by: NURSE PRACTITIONER

## 2023-04-01 PROCEDURE — 99214 OFFICE O/P EST MOD 30 MIN: CPT | Mod: 25,S$GLB,, | Performed by: NURSE PRACTITIONER

## 2023-04-01 PROCEDURE — 99214 PR OFFICE/OUTPT VISIT, EST, LEVL IV, 30-39 MIN: ICD-10-PCS | Mod: 25,S$GLB,, | Performed by: NURSE PRACTITIONER

## 2023-04-01 PROCEDURE — 90714 TD VACC NO PRESV 7 YRS+ IM: CPT | Mod: S$GLB,,, | Performed by: NURSE PRACTITIONER

## 2023-04-01 PROCEDURE — 90471 TD VACCINE GREATER THAN OR EQUAL TO 7YO WITH PRESERVATIVE IM: ICD-10-PCS | Mod: S$GLB,,, | Performed by: NURSE PRACTITIONER

## 2023-04-01 NOTE — PROGRESS NOTES
"Subjective:      Patient ID: Joshua Posada is a 42 y.o. male.    Vitals:  height is 6' 2" (1.88 m) and weight is 79.8 kg (176 lb). His oral temperature is 97.4 °F (36.3 °C). His blood pressure is 114/79 and his pulse is 53 (abnormal). His respiration is 18 and oxygen saturation is 98%.     Chief Complaint: Hand Injury    Pt states "he got stuck in the hand with a nail."  Unknown Td status. No tendon injury. FROM    Hand Injury   His dominant hand is their left hand.   ROS   Objective:     Physical Exam   Abdominal: Normal appearance.   Musculoskeletal: Normal range of motion.         General: Signs of injury present. Normal range of motion.   Neurological: He is alert.   Skin: Skin is warm and dry.         Comments: Shallow puncture wound left hand palm at 4th finger.    Vitals reviewed.    Assessment:     1. Puncture wound        Plan:       Puncture wound    Other orders  -     (In Office Administered) Td Vaccine                  No s/s of infection  Er protocol discussed.   "

## 2023-08-01 ENCOUNTER — OFFICE VISIT (OUTPATIENT)
Dept: URGENT CARE | Facility: CLINIC | Age: 43
End: 2023-08-01
Payer: MEDICAID

## 2023-08-01 VITALS
SYSTOLIC BLOOD PRESSURE: 126 MMHG | WEIGHT: 171 LBS | BODY MASS INDEX: 21.94 KG/M2 | HEART RATE: 95 BPM | TEMPERATURE: 103 F | HEIGHT: 74 IN | DIASTOLIC BLOOD PRESSURE: 83 MMHG | RESPIRATION RATE: 20 BRPM | OXYGEN SATURATION: 97 %

## 2023-08-01 DIAGNOSIS — J02.9 SORE THROAT: Primary | ICD-10-CM

## 2023-08-01 DIAGNOSIS — U07.1 COVID-19: ICD-10-CM

## 2023-08-01 LAB
CTP QC/QA: YES
CTP QC/QA: YES
S PYO RRNA THROAT QL PROBE: NEGATIVE
SARS-COV-2 AG RESP QL IA.RAPID: POSITIVE

## 2023-08-01 PROCEDURE — 87811 SARS CORONAVIRUS 2 ANTIGEN POCT, MANUAL READ: ICD-10-PCS | Mod: QW,S$GLB,, | Performed by: STUDENT IN AN ORGANIZED HEALTH CARE EDUCATION/TRAINING PROGRAM

## 2023-08-01 PROCEDURE — 99214 OFFICE O/P EST MOD 30 MIN: CPT | Mod: S$GLB,,, | Performed by: STUDENT IN AN ORGANIZED HEALTH CARE EDUCATION/TRAINING PROGRAM

## 2023-08-01 PROCEDURE — 87880 POCT RAPID STREP A: ICD-10-PCS | Mod: QW,,, | Performed by: STUDENT IN AN ORGANIZED HEALTH CARE EDUCATION/TRAINING PROGRAM

## 2023-08-01 PROCEDURE — 87811 SARS-COV-2 COVID19 W/OPTIC: CPT | Mod: QW,S$GLB,, | Performed by: STUDENT IN AN ORGANIZED HEALTH CARE EDUCATION/TRAINING PROGRAM

## 2023-08-01 PROCEDURE — 87880 STREP A ASSAY W/OPTIC: CPT | Mod: QW,,, | Performed by: STUDENT IN AN ORGANIZED HEALTH CARE EDUCATION/TRAINING PROGRAM

## 2023-08-01 PROCEDURE — 99214 PR OFFICE/OUTPT VISIT, EST, LEVL IV, 30-39 MIN: ICD-10-PCS | Mod: S$GLB,,, | Performed by: STUDENT IN AN ORGANIZED HEALTH CARE EDUCATION/TRAINING PROGRAM

## 2023-08-01 RX ORDER — IBUPROFEN 200 MG
800 TABLET ORAL
Status: COMPLETED | OUTPATIENT
Start: 2023-08-01 | End: 2023-08-01

## 2023-08-01 RX ORDER — BENZONATATE 200 MG/1
200 CAPSULE ORAL 3 TIMES DAILY PRN
Qty: 30 CAPSULE | Refills: 0 | Status: SHIPPED | OUTPATIENT
Start: 2023-08-01 | End: 2023-08-11

## 2023-08-01 RX ORDER — ACETAMINOPHEN 500 MG
1000 TABLET ORAL
Status: COMPLETED | OUTPATIENT
Start: 2023-08-01 | End: 2023-08-01

## 2023-08-01 RX ADMIN — Medication 800 MG: at 02:08

## 2023-08-01 RX ADMIN — Medication 1000 MG: at 02:08

## 2023-08-01 NOTE — PROGRESS NOTES
"Subjective:      Patient ID: Joshua Posada is a 43 y.o. male.    Vitals:  height is 6' 2" (1.88 m) and weight is 77.6 kg (171 lb). His temperature is 103.6 °F (39.8 °C) (abnormal). His blood pressure is 126/83 and his pulse is 95. His respiration is 20 and oxygen saturation is 97%.     Chief Complaint: Sore Throat    Patient is a 43-year-old male who presents to clinic for evaluation of fever and sore throat.  Patient reports he is not vaccinated.  Patient denies any recent or known sick exposures.  Patient reports symptoms x3 days.  Patient reports over-the-counter DayQuil with some relief to symptoms.  Patient reports that he has experienced fatigue, fever with a temperature max of 103.6° F, sore throat with painful swallowing, and a nonproductive cough.  Patient states primarily coughing because he tries to clear his throat.  Patient states that he has not experienced any headaches or dizziness, body aches, rash, ear pain, nasal sinus congestion, drooling, chest pain or palpitations, shortness of breath or wheezing, abdominal pain, nausea or vomiting, diarrhea, urinary symptoms, or change in mentation.        Constitution: Positive for fatigue and fever (Temperature max 103.6° F).   HENT:  Positive for sore throat and trouble swallowing (Painful swallowing). Negative for ear pain, drooling and congestion.    Neck: neck negative.   Cardiovascular: Negative.  Negative for chest pain and palpitations.   Eyes: Negative.    Respiratory:  Positive for cough (Reports slight cough due to clearing his throat). Negative for chest tightness, sputum production, shortness of breath and wheezing.    Gastrointestinal: Negative.  Negative for abdominal pain, nausea, vomiting and diarrhea.   Endocrine: negative.   Genitourinary: Negative.    Musculoskeletal: Negative.  Negative for muscle ache.   Skin: Negative.  Negative for color change, pale, rash and erythema.   Allergic/Immunologic: Negative.    Neurological: Negative.  " Negative for dizziness, light-headedness, passing out, headaches, disorientation and altered mental status.   Hematologic/Lymphatic: Negative.    Psychiatric/Behavioral: Negative.  Negative for altered mental status, disorientation and confusion.       Objective:     Physical Exam   Constitutional: He is oriented to person, place, and time. He appears well-developed. He is cooperative. He appears ill. No distress.   HENT:   Head: Normocephalic and atraumatic.   Ears:   Right Ear: Hearing, tympanic membrane, external ear and ear canal normal.   Left Ear: Hearing, tympanic membrane, external ear and ear canal normal.   Nose: Nose normal. No mucosal edema, rhinorrhea, nasal deformity or congestion. No epistaxis. Right sinus exhibits no maxillary sinus tenderness and no frontal sinus tenderness. Left sinus exhibits no maxillary sinus tenderness and no frontal sinus tenderness.   Mouth/Throat: Uvula is midline and mucous membranes are normal. Mucous membranes are moist. No trismus in the jaw. Normal dentition. No uvula swelling. Posterior oropharyngeal erythema present. No oropharyngeal exudate. Oropharynx is clear.   Eyes: Conjunctivae and lids are normal. Pupils are equal, round, and reactive to light. Right eye exhibits no discharge. Left eye exhibits no discharge. No scleral icterus.   Neck: Trachea normal and phonation normal. Neck supple. No neck rigidity present.   Cardiovascular: Normal rate, regular rhythm, normal heart sounds and normal pulses.   Pulmonary/Chest: Effort normal and breath sounds normal. No respiratory distress (Unlabored.  Equal rise and fall of chest.  Able speak in full complete sentences.  No adventitious breath sounds noted.). He has no wheezes. He has no rhonchi. He has no rales.   Abdominal: Normal appearance and bowel sounds are normal. He exhibits no distension. Soft. There is no abdominal tenderness.   Musculoskeletal: Normal range of motion.         General: Normal range of motion.       Cervical back: He exhibits no tenderness.   Lymphadenopathy:     He has no cervical adenopathy.   Neurological: He is alert and oriented to person, place, and time. He exhibits normal muscle tone.   Skin: Skin is warm, dry, intact, not diaphoretic, not pale and no rash. Capillary refill takes 2 to 3 seconds. No erythema   Psychiatric: His speech is normal and behavior is normal. Judgment and thought content normal.   Nursing note and vitals reviewed.      Assessment:     1. Sore throat    2. COVID-19        Plan:       Sore throat  -     POCT rapid strep A  -     SARS Coronavirus 2 Antigen, POCT Manual Read    COVID-19    Other orders  -     acetaminophen tablet 1,000 mg  -     ibuprofen tablet 800 mg  -     nirmatrelvir-ritonavir 300 mg (150 mg x 2)-100 mg copackaged tablets (EUA); Take 3 tablets by mouth 2 (two) times daily for 5 days. Each dose contains 2 nirmatrelvir (pink tablets) and 1 ritonavir (white tablet). Take all 3 tablets together  Dispense: 30 tablet; Refill: 0  -     benzonatate (TESSALON) 200 MG capsule; Take 1 capsule (200 mg total) by mouth 3 (three) times daily as needed for Cough.  Dispense: 30 capsule; Refill: 0                Labs:  COVID positive.  Rapid strep negative.    Tylenol 1 g by mouth in clinic for fever.  Motrin 800 mg by mouth in clinic for fever.  Patient tolerated well.  No complications noted.  Repeat temperature 103° F.  Patient reports feels comfortable treating fever at home.  Quarantine as directed.  Quarantine information provided to patient.  COVID recommendations provided.  (Vitamin-C, vitamin D3, Pepcid, Zyrtec, zinc)  Tylenol per package instructions for any pain or fever.  May rotate with Motrin if unable to control pain or fever along with Tylenol.  Monitor home SpO2 and present to emergency department with readings less than 92%.  Take medications as prescribed.  Assure adequate hydration.  Follow-up with PCP in 1-2 days.  Return to clinic as needed.  To ED for any  new or acutely worsening symptoms including but not limited to chest pain, palpitations, shortness of breath, or fever greater than 103° F.  Patient in agreement with plan of care.     DISCLAIMER: Please note that my documentation in this Electronic Healthcare Record was produced using speech recognition software and therefore may contain errors related to that software system.These could include grammar, punctuation and spelling errors or the inclusion/exclusion of phrases that were not intended. Garbled syntax, mangled pronouns, and other bizarre constructions may be attributed to that software system.

## 2023-08-01 NOTE — LETTER
August 1, 2023      [facility]  [address] [facility]  [facility] [address]  Phone: [phone]       Patient: Joshua Posada   YOB: 1980  Date of Visit: 08/01/2023    To Whom It May Concern:    Rosy Posada  was at [facility] on 08/01/2023. The patient may return to work/school on 08/04/2023 with restrictions (5 days and mask wearing to complete total 10 day quarantine per CDC guidelines). If you have any questions or concerns, or if I can be of further assistance, please do not hesitate to contact me.    Sincerely,    Heath Mora NP

## 2024-08-22 ENCOUNTER — HOSPITAL ENCOUNTER (EMERGENCY)
Facility: HOSPITAL | Age: 44
Discharge: HOME | End: 2024-08-22
Attending: EMERGENCY MEDICINE
Payer: MEDICAID

## 2024-08-22 VITALS
RESPIRATION RATE: 18 BRPM | DIASTOLIC BLOOD PRESSURE: 72 MMHG | TEMPERATURE: 99 F | SYSTOLIC BLOOD PRESSURE: 144 MMHG | HEART RATE: 71 BPM | BODY MASS INDEX: 21.84 KG/M2 | WEIGHT: 170.06 LBS | OXYGEN SATURATION: 98 %

## 2024-08-22 DIAGNOSIS — J02.9 PHARYNGITIS, UNSPECIFIED ETIOLOGY: Primary | ICD-10-CM

## 2024-08-22 LAB — GROUP A STREP, MOLECULAR: NEGATIVE

## 2024-08-22 PROCEDURE — 96372 THER/PROPH/DIAG INJ SC/IM: CPT | Performed by: EMERGENCY MEDICINE

## 2024-08-22 PROCEDURE — 63600175 PHARM REV CODE 636 W HCPCS: Performed by: EMERGENCY MEDICINE

## 2024-08-22 PROCEDURE — 99284 EMERGENCY DEPT VISIT MOD MDM: CPT | Mod: 25

## 2024-08-22 PROCEDURE — 87651 STREP A DNA AMP PROBE: CPT | Performed by: EMERGENCY MEDICINE

## 2024-08-22 RX ORDER — DEXAMETHASONE SODIUM PHOSPHATE 4 MG/ML
8 INJECTION, SOLUTION INTRA-ARTICULAR; INTRALESIONAL; INTRAMUSCULAR; INTRAVENOUS; SOFT TISSUE
Status: COMPLETED | OUTPATIENT
Start: 2024-08-22 | End: 2024-08-22

## 2024-08-22 RX ORDER — DIPHENHYDRAMINE HCL 25 MG
25 CAPSULE ORAL EVERY 6 HOURS PRN
Qty: 20 CAPSULE | Refills: 0 | Status: SHIPPED | OUTPATIENT
Start: 2024-08-22

## 2024-08-22 RX ADMIN — DEXAMETHASONE SODIUM PHOSPHATE 8 MG: 4 INJECTION, SOLUTION INTRA-ARTICULAR; INTRALESIONAL; INTRAMUSCULAR; INTRAVENOUS; SOFT TISSUE at 03:08

## 2024-08-22 NOTE — DISCHARGE INSTRUCTIONS
Drink lots of fluids.  Rest.  Salt water gargling.  Return to emergency department for worsening symptoms or any problems

## 2024-08-22 NOTE — ED PROVIDER NOTES
Encounter Date: 8/22/2024       History     Chief Complaint   Patient presents with    Sore Throat     44-year-old male with sore throat and scratchy throat for about 4 days.  Denies fever or chills or nausea vomiting or chest pain or shortness of breath or abdominal pain or weakness or numbness.      Review of patient's allergies indicates:  No Known Allergies  No past medical history on file.  No past surgical history on file.  No family history on file.  Social History     Tobacco Use    Smoking status: Never    Smokeless tobacco: Never   Substance Use Topics    Alcohol use: No    Drug use: Never     Review of Systems   Constitutional: Negative.    HENT:  Positive for sore throat.    Eyes: Negative.    Respiratory: Negative.     Cardiovascular: Negative.    Gastrointestinal: Negative.    Endocrine: Negative.    Genitourinary: Negative.    Skin: Negative.    Allergic/Immunologic: Negative.    Neurological: Negative.    Hematological: Negative.    Psychiatric/Behavioral: Negative.     All other systems reviewed and are negative.      Physical Exam     Initial Vitals [08/22/24 0219]   BP Pulse Resp Temp SpO2   118/76 64 16 99.1 °F (37.3 °C) 96 %      MAP       --         Physical Exam    Nursing note and vitals reviewed.  Constitutional: He appears well-developed and well-nourished.   HENT:   Head: Normocephalic and atraumatic.   Nose: Nose normal.   Mild pharyngeal erythema   Eyes: Conjunctivae and EOM are normal.   Neck: No tracheal deviation present.   Normal range of motion.  Cardiovascular:  Normal rate, regular rhythm, normal heart sounds and intact distal pulses.     Exam reveals no friction rub.       No murmur heard.  Pulmonary/Chest: Breath sounds normal. No respiratory distress. He has no wheezes. He has no rales.   Abdominal: Abdomen is soft. He exhibits no distension. There is no abdominal tenderness.   Musculoskeletal:         General: Normal range of motion.      Cervical back: Normal range of motion.      Neurological: He is alert and oriented to person, place, and time. He has normal strength.   Skin: Skin is warm and dry. Capillary refill takes less than 2 seconds.   Psychiatric: He has a normal mood and affect. Thought content normal.         ED Course   Procedures  Labs Reviewed   GROUP A STREP, MOLECULAR       Result Value    Group A Strep, Molecular Negative            Imaging Results    None          Medications   dexAMETHasone injection 8 mg (has no administration in time range)     Medical Decision Making  44-year-old male with mild pharyngitis.  More consistent with viral pharyngitis and allergic pharyngitis.  No exudates noted.  No anterior cervical lymphadenopathy.  Will do screening strep test.  Will treat with steroid.  Plan is to discharged with return precautions and instructions and follow-up.    Amount and/or Complexity of Data Reviewed  Labs: ordered. Decision-making details documented in ED Course.    Risk  OTC drugs.  Prescription drug management.                                      Clinical Impression:  Final diagnoses:  [J02.9] Pharyngitis, unspecified etiology (Primary)          ED Disposition Condition    Discharge Stable          ED Prescriptions       Medication Sig Dispense Start Date End Date Auth. Provider    diphenhydrAMINE (BENADRYL) 25 mg capsule Take 1 capsule (25 mg total) by mouth every 6 (six) hours as needed for Itching or Allergies. 20 capsule 8/22/2024 -- Mayra Kramer MD          Follow-up Information       Follow up With Specialties Details Why Contact Info    Thompson Alcantara III, MD General Practice In 2 days  [address]  [address]  [facility] [place] [facility] [address]  [phone]               Mayra Kramer MD  08/22/24 0335